# Patient Record
Sex: FEMALE | Race: WHITE | URBAN - METROPOLITAN AREA
[De-identification: names, ages, dates, MRNs, and addresses within clinical notes are randomized per-mention and may not be internally consistent; named-entity substitution may affect disease eponyms.]

---

## 2021-06-30 ENCOUNTER — NEW PATIENT COMPREHENSIVE (OUTPATIENT)
Dept: URBAN - METROPOLITAN AREA CLINIC 49 | Facility: CLINIC | Age: 71
End: 2021-06-30

## 2021-06-30 DIAGNOSIS — H25.813: ICD-10-CM

## 2021-06-30 DIAGNOSIS — H35.372: ICD-10-CM

## 2021-06-30 DIAGNOSIS — H04.123: ICD-10-CM

## 2021-06-30 DIAGNOSIS — D31.31: ICD-10-CM

## 2021-06-30 PROCEDURE — 92015 DETERMINE REFRACTIVE STATE: CPT

## 2021-06-30 PROCEDURE — 92134 CPTRZ OPH DX IMG PST SGM RTA: CPT

## 2021-06-30 PROCEDURE — 92004 COMPRE OPH EXAM NEW PT 1/>: CPT

## 2021-06-30 ASSESSMENT — VISUAL ACUITY
OS_CC: 20/25-1
OD_CC: 20/30-2
OU_CC: J1+

## 2021-06-30 ASSESSMENT — TONOMETRY
OS_IOP_MMHG: 17
OD_IOP_MMHG: 17

## 2022-06-30 ENCOUNTER — COMPREHENSIVE EXAM (OUTPATIENT)
Dept: URBAN - METROPOLITAN AREA CLINIC 49 | Facility: CLINIC | Age: 72
End: 2022-06-30

## 2022-06-30 DIAGNOSIS — H35.363: ICD-10-CM

## 2022-06-30 DIAGNOSIS — H04.123: ICD-10-CM

## 2022-06-30 DIAGNOSIS — H35.372: ICD-10-CM

## 2022-06-30 DIAGNOSIS — H25.813: ICD-10-CM

## 2022-06-30 DIAGNOSIS — H40.013: ICD-10-CM

## 2022-06-30 DIAGNOSIS — D31.31: ICD-10-CM

## 2022-06-30 PROCEDURE — 92014 COMPRE OPH EXAM EST PT 1/>: CPT

## 2022-06-30 PROCEDURE — 6876150 PUNCTUM PLUG, BILATERAL

## 2022-06-30 PROCEDURE — 92134 CPTRZ OPH DX IMG PST SGM RTA: CPT

## 2022-06-30 ASSESSMENT — TONOMETRY
OD_IOP_MMHG: 17
OS_IOP_MMHG: 17

## 2022-06-30 ASSESSMENT — VISUAL ACUITY
OD_CC: 20/30-2/+1
OS_GLARE: 20/50-1
OD_GLARE: >20/400
OD_GLARE: 20/100
OD_PH: 20/30-1/+1
OS_CC: 20/50-1/+1
OS_PH: 20/30
OS_GLARE: 20/40-1
OU_CC: J1-2

## 2022-06-30 NOTE — PROCEDURE NOTE: CLINICAL
PROCEDURE NOTE: Punctal Plugs, Bilateral Bilateral Lower Lids. Diagnosis: Dry Eye Syndrome. Informed consent was obtained. Size/location of plugs inserted: Hydrogel Form Fit. Patient tolerated procedure without complication or difficulty. Lei Rodriguez

## 2022-08-31 ENCOUNTER — DIAGNOSTICS ONLY (OUTPATIENT)
Dept: URBAN - METROPOLITAN AREA CLINIC 49 | Facility: CLINIC | Age: 72
End: 2022-08-31

## 2022-08-31 DIAGNOSIS — H40.013: ICD-10-CM

## 2022-08-31 PROCEDURE — 92083 EXTENDED VISUAL FIELD XM: CPT

## 2022-08-31 PROCEDURE — 92133 CPTRZD OPH DX IMG PST SGM ON: CPT

## 2023-07-05 ENCOUNTER — COMPREHENSIVE EXAM (OUTPATIENT)
Dept: URBAN - METROPOLITAN AREA CLINIC 49 | Facility: CLINIC | Age: 73
End: 2023-07-05

## 2023-07-05 DIAGNOSIS — H25.813: ICD-10-CM

## 2023-07-05 DIAGNOSIS — H04.123: ICD-10-CM

## 2023-07-05 DIAGNOSIS — D31.31: ICD-10-CM

## 2023-07-05 DIAGNOSIS — H35.3131: ICD-10-CM

## 2023-07-05 DIAGNOSIS — H40.013: ICD-10-CM

## 2023-07-05 DIAGNOSIS — H35.372: ICD-10-CM

## 2023-07-05 PROCEDURE — 76514 ECHO EXAM OF EYE THICKNESS: CPT

## 2023-07-05 PROCEDURE — 92134 CPTRZ OPH DX IMG PST SGM RTA: CPT

## 2023-07-05 PROCEDURE — 92014 COMPRE OPH EXAM EST PT 1/>: CPT

## 2023-07-05 ASSESSMENT — VISUAL ACUITY
OS_CC: 20/40
OS_GLARE: <400
OD_CC: 20/30+2
OU_CC: J1
OS_PH: 20/30
OS_GLARE: 20/80
OD_GLARE: <400

## 2023-07-05 ASSESSMENT — TONOMETRY
OS_IOP_MMHG: 15
OD_IOP_MMHG: 17
OD_IOP_MMHG: 18
OS_IOP_MMHG: 18

## 2023-07-05 ASSESSMENT — PACHYMETRY
OD_CT_UM: 564
OS_CT_UM: 583